# Patient Record
Sex: FEMALE | Race: WHITE
[De-identification: names, ages, dates, MRNs, and addresses within clinical notes are randomized per-mention and may not be internally consistent; named-entity substitution may affect disease eponyms.]

---

## 2017-04-12 ENCOUNTER — RX ONLY (RX ONLY)
Age: 72
End: 2017-04-12

## 2017-04-19 ENCOUNTER — RX ONLY (RX ONLY)
Age: 72
End: 2017-04-19

## 2018-04-03 ENCOUNTER — APPOINTMENT (RX ONLY)
Dept: URBAN - METROPOLITAN AREA CLINIC 18 | Facility: CLINIC | Age: 73
Setting detail: DERMATOLOGY
End: 2018-04-03

## 2018-04-03 DIAGNOSIS — L57.0 ACTINIC KERATOSIS: ICD-10-CM

## 2018-04-03 DIAGNOSIS — L28.1 PRURIGO NODULARIS: ICD-10-CM

## 2018-04-03 PROBLEM — D48.5 NEOPLASM OF UNCERTAIN BEHAVIOR OF SKIN: Status: ACTIVE | Noted: 2018-04-03

## 2018-04-03 PROBLEM — J30.1 ALLERGIC RHINITIS DUE TO POLLEN: Status: ACTIVE | Noted: 2018-04-03

## 2018-04-03 PROCEDURE — ? COUNSELING

## 2018-04-03 PROCEDURE — 11100: CPT

## 2018-04-03 PROCEDURE — 99202 OFFICE O/P NEW SF 15 MIN: CPT | Mod: 25

## 2018-04-03 PROCEDURE — ? BIOPSY BY SHAVE METHOD

## 2018-04-03 PROCEDURE — ? PRESCRIPTION

## 2018-04-03 RX ORDER — CALCIPOTRIENE 50 UG/G
CREAM TOPICAL
Qty: 1 | Refills: 1 | Status: ERX | COMMUNITY
Start: 2018-04-03

## 2018-04-03 RX ORDER — NALTREXONE HYDROCHLORIDE 50 MG/1
TABLET, FILM COATED ORAL
Qty: 15 | Refills: 2 | Status: ERX | COMMUNITY
Start: 2018-04-03

## 2018-04-03 RX ADMIN — CALCIPOTRIENE: 50 CREAM TOPICAL at 20:31

## 2018-04-03 RX ADMIN — NALTREXONE HYDROCHLORIDE: 50 TABLET, FILM COATED ORAL at 20:31

## 2018-04-03 ASSESSMENT — LOCATION SIMPLE DESCRIPTION DERM
LOCATION SIMPLE: RIGHT PRETIBIAL REGION
LOCATION SIMPLE: UPPER BACK

## 2018-04-03 ASSESSMENT — LOCATION DETAILED DESCRIPTION DERM
LOCATION DETAILED: RIGHT PROXIMAL PRETIBIAL REGION
LOCATION DETAILED: SUPERIOR THORACIC SPINE

## 2018-04-03 ASSESSMENT — LOCATION ZONE DERM
LOCATION ZONE: TRUNK
LOCATION ZONE: LEG

## 2018-04-03 NOTE — PROCEDURE: BIOPSY BY SHAVE METHOD
Detail Level: Detailed
Cryotherapy Text: The wound bed was treated with cryotherapy after the biopsy was performed.
Anesthesia Volume In Cc: 0.5
Notification Instructions: Patient will be notified of biopsy results. However, patient instructed to call the office if not contacted within 2 weeks.
Type Of Destruction Used: Curettage
Biopsy Method: Dermablade
Post-Care Instructions: I reviewed with the patient in detail post-care instructions. Patient is to keep the biopsy site dry overnight, and then apply bacitracin twice daily until healed. Patient may apply hydrogen peroxide soaks to remove any crusting.
Dressing: bandage
Consent: Written consent was obtained and risks were reviewed including but not limited to scarring, infection, bleeding, scabbing, incomplete removal, nerve damage and allergy to anesthesia.
Additional Anesthesia Volume In Cc (Will Not Render If 0): 0
Destruction After The Procedure: No
Anesthesia Type: 1% lidocaine with epinephrine
Wound Care: Bacitracin
Electrodesiccation Text: The wound bed was treated with electrodesiccation after the biopsy was performed.
Hemostasis: Drysol
Electrodesiccation And Curettage Text: The wound bed was treated with electrodesiccation and curettage after the biopsy was performed.
Biopsy Type: H and E
Silver Nitrate Text: The wound bed was treated with silver nitrate after the biopsy was performed.
Billing Type: Third-Party Bill
Curettage Text: The wound bed was treated with curettage after the biopsy was performed.

## 2019-07-05 PROBLEM — H35.723 SEROUS DETACHMENT OF RETINAL PIGMENT EPITHELIUM; BOTH EYES: Status: ACTIVE | Noted: 2017-09-27

## 2019-07-05 PROBLEM — Z96.1 PSEUDOPHAKIA: Status: ACTIVE | Noted: 2017-09-27

## 2019-07-05 PROBLEM — H35.3122 AGE RELATED MACULAR DEGENERATION DRY; RIGHT EYE INTERMEDIATE, LEFT EYE INTERMEDIATE: Status: ACTIVE | Noted: 2017-09-27

## 2019-07-05 PROBLEM — H25.12 CATARACT; LEFT EYE: Status: ACTIVE | Noted: 2017-09-27

## 2019-07-05 PROBLEM — H35.3112 AGE RELATED MACULAR DEGENERATION DRY; RIGHT EYE INTERMEDIATE, LEFT EYE INTERMEDIATE: Status: ACTIVE | Noted: 2017-09-27

## 2021-05-11 ENCOUNTER — OFFICE (OUTPATIENT)
Dept: URBAN - METROPOLITAN AREA CLINIC 94 | Facility: CLINIC | Age: 76
Setting detail: OPHTHALMOLOGY
End: 2021-05-11
Payer: MEDICARE

## 2021-05-11 DIAGNOSIS — H04.121: ICD-10-CM

## 2021-05-11 DIAGNOSIS — H35.3112: ICD-10-CM

## 2021-05-11 DIAGNOSIS — H35.723: ICD-10-CM

## 2021-05-11 DIAGNOSIS — H35.3122: ICD-10-CM

## 2021-05-11 DIAGNOSIS — Z96.1: ICD-10-CM

## 2021-05-11 DIAGNOSIS — H26.491: ICD-10-CM

## 2021-05-11 PROCEDURE — 92250 FUNDUS PHOTOGRAPHY W/I&R: CPT | Performed by: OPHTHALMOLOGY

## 2021-05-11 PROCEDURE — 92004 COMPRE OPH EXAM NEW PT 1/>: CPT | Performed by: OPHTHALMOLOGY

## 2021-05-11 ASSESSMENT — REFRACTION_CURRENTRX
OS_CYLINDER: -0.50
OD_SPHERE: +1.25
OD_OVR_VA: 20/
OS_OVR_VA: 20/
OD_OVR_VA: 20/
OD_CYLINDER: -0.50
OD_SPHERE: +2.50
OD_ADD: +2.25
OS_SPHERE: +2.50
OS_VPRISM_DIRECTION: PROGS
OS_OVR_VA: 20/
OS_AXIS: 101
OD_AXIS: 044
OS_ADD: +2.25
OS_SPHERE: +2.50
OD_VPRISM_DIRECTION: PROGS

## 2021-05-11 ASSESSMENT — REFRACTION_MANIFEST
OD_ADD: +2.50
OS_SPHERE: +1.50
OS_VA2: 20/40
OD_SPHERE: PLANO
OS_AXIS: 100
OD_VA1: 20/20
OD_AXIS: 020
OD_CYLINDER: -0.75
OD_AXIS: 138
OD_CYLINDER: -0.25
OD_VA2: 20/40
OS_ADD: +2.50
OS_CYLINDER: -0.50
OU_VA: 20/30
OS_VA1: 20/30+
OD_VA1: 20/30
OD_SPHERE: -2.50

## 2021-05-11 ASSESSMENT — KERATOMETRY
OS_K2POWER_DIOPTERS: 44.75
METHOD_AUTO_MANUAL: AUTO
OS_AXISANGLE_DEGREES: 034
OS_K1POWER_DIOPTERS: 44.00
OD_AXISANGLE_DEGREES: 093
OD_K2POWER_DIOPTERS: 45.25
OD_K1POWER_DIOPTERS: 44.50

## 2021-05-11 ASSESSMENT — REFRACTION_AUTOREFRACTION
OD_CYLINDER: -0.50
OS_AXIS: 62
OS_SPHERE: PL
OS_CYLINDER: -0.25
OD_SPHERE: -1.00
OD_AXIS: 117

## 2021-05-11 ASSESSMENT — AXIALLENGTH_DERIVED
OD_AL: 23.5739
OD_AL: 24.2234
OS_AL: 22.8103

## 2021-05-11 ASSESSMENT — SPHEQUIV_DERIVED
OD_SPHEQUIV: -2.875
OD_SPHEQUIV: -1.25
OS_SPHEQUIV: 1.25

## 2021-05-11 ASSESSMENT — TONOMETRY
OS_IOP_MMHG: 11
OD_IOP_MMHG: 11

## 2021-05-11 ASSESSMENT — VISUAL ACUITY
OD_BCVA: 20/20
OS_BCVA: 20/30

## 2021-07-31 ENCOUNTER — OFFICE (OUTPATIENT)
Dept: URBAN - METROPOLITAN AREA CLINIC 94 | Facility: CLINIC | Age: 76
Setting detail: OPHTHALMOLOGY
End: 2021-07-31
Payer: COMMERCIAL

## 2021-07-31 DIAGNOSIS — H26.491: ICD-10-CM

## 2021-07-31 PROCEDURE — 66821 AFTER CATARACT LASER SURGERY: CPT | Performed by: OPHTHALMOLOGY

## 2021-07-31 ASSESSMENT — REFRACTION_CURRENTRX
OS_CYLINDER: -0.50
OD_ADD: +2.25
OD_SPHERE: +2.50
OS_OVR_VA: 20/
OS_SPHERE: +2.50
OS_ADD: +2.25
OD_CYLINDER: -0.50
OD_OVR_VA: 20/
OD_VPRISM_DIRECTION: PROGS
OS_OVR_VA: 20/
OS_SPHERE: +2.50
OD_AXIS: 044
OD_SPHERE: +1.25
OS_VPRISM_DIRECTION: PROGS
OD_OVR_VA: 20/
OS_AXIS: 101

## 2021-07-31 ASSESSMENT — REFRACTION_MANIFEST
OS_VA2: 20/40
OD_CYLINDER: -0.75
OD_VA1: 20/20
OD_VA1: 20/30
OS_ADD: +2.50
OS_SPHERE: +1.50
OD_AXIS: 138
OD_SPHERE: PLANO
OS_AXIS: 100
OD_CYLINDER: -0.25
OS_CYLINDER: -0.50
OD_VA2: 20/40
OD_SPHERE: -2.50
OU_VA: 20/30
OS_VA1: 20/30+
OD_ADD: +2.50
OD_AXIS: 020

## 2021-07-31 ASSESSMENT — REFRACTION_AUTOREFRACTION
OS_SPHERE: -0.25
OD_SPHERE: -0.75
OS_AXIS: 116
OD_CYLINDER: -1.25
OD_AXIS: 152
OS_CYLINDER: -0.25

## 2021-07-31 ASSESSMENT — AXIALLENGTH_DERIVED
OD_AL: 23.6687
OS_AL: 23.1939
OD_AL: 24.2719
OS_AL: 22.5977

## 2021-07-31 ASSESSMENT — KERATOMETRY
OS_AXISANGLE_DEGREES: 083
OD_K2POWER_DIOPTERS: 45.00
OS_K1POWER_DIOPTERS: 44.50
METHOD_AUTO_MANUAL: AUTO
OD_K1POWER_DIOPTERS: 44.50
OD_AXISANGLE_DEGREES: 068
OS_K2POWER_DIOPTERS: 45.50

## 2021-07-31 ASSESSMENT — SPHEQUIV_DERIVED
OD_SPHEQUIV: -1.375
OD_SPHEQUIV: -2.875
OS_SPHEQUIV: -0.375
OS_SPHEQUIV: 1.25

## 2021-07-31 ASSESSMENT — TONOMETRY
OD_IOP_MMHG: 13
OS_IOP_MMHG: 12

## 2021-07-31 ASSESSMENT — VISUAL ACUITY
OS_BCVA: 20/70
OD_BCVA: 20/30

## 2021-07-31 ASSESSMENT — CONFRONTATIONAL VISUAL FIELD TEST (CVF)
OD_FINDINGS: FULL
OS_FINDINGS: FULL

## 2021-08-04 PROBLEM — H26.491 PSC, OBSCURING VISION; RIGHT EYE: Status: ACTIVE | Noted: 2021-05-11
